# Patient Record
Sex: MALE | Race: BLACK OR AFRICAN AMERICAN | NOT HISPANIC OR LATINO | Employment: UNEMPLOYED | URBAN - METROPOLITAN AREA
[De-identification: names, ages, dates, MRNs, and addresses within clinical notes are randomized per-mention and may not be internally consistent; named-entity substitution may affect disease eponyms.]

---

## 2020-03-22 ENCOUNTER — HOSPITAL ENCOUNTER (EMERGENCY)
Facility: HOSPITAL | Age: 65
Discharge: HOME | End: 2020-03-22
Attending: EMERGENCY MEDICINE
Payer: MEDICARE

## 2020-03-22 VITALS
OXYGEN SATURATION: 98 % | RESPIRATION RATE: 16 BRPM | HEART RATE: 80 BPM | HEIGHT: 69 IN | WEIGHT: 200 LBS | SYSTOLIC BLOOD PRESSURE: 163 MMHG | BODY MASS INDEX: 29.62 KG/M2 | TEMPERATURE: 99 F | DIASTOLIC BLOOD PRESSURE: 100 MMHG

## 2020-03-22 DIAGNOSIS — Z59.00 HOMELESSNESS: ICD-10-CM

## 2020-03-22 DIAGNOSIS — T73.0XXA HUNGRY, INITIAL ENCOUNTER: ICD-10-CM

## 2020-03-22 DIAGNOSIS — I87.2 CHRONIC VENOUS INSUFFICIENCY: Primary | ICD-10-CM

## 2020-03-22 DIAGNOSIS — M79.89 SWELLING OF LOWER EXTREMITY: ICD-10-CM

## 2020-03-22 DIAGNOSIS — I82.812 SUPERFICIAL THROMBOSIS OF LEFT LOWER EXTREMITY: ICD-10-CM

## 2020-03-22 PROCEDURE — 99283 EMERGENCY DEPT VISIT LOW MDM: CPT

## 2020-03-22 PROCEDURE — 99285 EMERGENCY DEPT VISIT HI MDM: CPT | Mod: ,,, | Performed by: EMERGENCY MEDICINE

## 2020-03-22 PROCEDURE — 25000003 PHARM REV CODE 250: Performed by: GENERAL PRACTICE

## 2020-03-22 PROCEDURE — 99285 PR EMERGENCY DEPT VISIT,LEVEL V: ICD-10-PCS | Mod: ,,, | Performed by: EMERGENCY MEDICINE

## 2020-03-22 RX ORDER — FUROSEMIDE 40 MG/1
40 TABLET ORAL
Status: COMPLETED | OUTPATIENT
Start: 2020-03-22 | End: 2020-03-22

## 2020-03-22 RX ADMIN — FUROSEMIDE 40 MG: 40 TABLET ORAL at 04:03

## 2020-03-22 SDOH — SOCIAL DETERMINANTS OF HEALTH (SDOH): HOMELESSNESS UNSPECIFIED: Z59.00

## 2020-03-22 NOTE — ED PROVIDER NOTES
"Encounter Date: 3/22/2020       History     Chief Complaint   Patient presents with    Leg Swelling     BLE edema for years. couldnt able well for 4 days d/t swelling. denies cough, sob     64 year old male with past medical history of superficial DVT, HTN, CHF, HLD, homelessness presents to the emergency department for evaluation of 20+ year's of chronic lower extremity swelling and 2 month's of chronic cough.  Recently evaluated at outside hospital on 03/17/20 with evaluation for heart failure exacerbation, renal dysfunction, respiratory illnesses with laboratory evaluation, CXR, US which were only significant for superficial blood clot.  Given his homelessness social work/case management was consulted to address all his chronic medical conditions and help in getting him all his BP, CHF, and blood clot medications which were sent to the [facility]'s pharmacy.  When asked what his primary complaint was it was " I am damn hungry and can I have some chips or a sandwich"; also "my legs are always damn swollen and hurt:"        Review of patient's allergies indicates:  No Known Allergies  Past Medical History:   Diagnosis Date    Hypertension      History reviewed. No pertinent surgical history.  History reviewed. No pertinent family history.  Social History     Tobacco Use    Smoking status: Current Some Day Smoker   Substance Use Topics    Alcohol use: Yes    Drug use: Never     Review of Systems   Constitutional: Positive for fatigue. Negative for activity change, chills and fever.   HENT: Negative for trouble swallowing and voice change.    Eyes: Negative for photophobia and visual disturbance.   Respiratory: Positive for cough.    Cardiovascular: Positive for leg swelling. Negative for chest pain and palpitations.   Gastrointestinal: Negative for abdominal distention, abdominal pain, nausea and vomiting.   Genitourinary: Negative for dysuria, flank pain and frequency.   Musculoskeletal: Positive for joint " swelling and myalgias.   Skin: Positive for color change and wound.   Neurological: Positive for weakness (generalized). Negative for dizziness, light-headedness and headaches.   Psychiatric/Behavioral: Negative for agitation, behavioral problems and confusion.     Physical Exam     Initial Vitals [03/22/20 0316]   BP Pulse Resp Temp SpO2   130/80 72 20 98.7 °F (37.1 °C) 98 %      MAP       --         Physical Exam    Constitutional: He appears well-developed and well-nourished. He is not diaphoretic. No distress.   Unkept; malodorous male in no apparent distress.    HENT:   Head: Normocephalic and atraumatic.   Right Ear: External ear normal.   Left Ear: External ear normal.   Nose: Nose normal.   Mouth/Throat: Oropharynx is clear and moist.   Neck: No tracheal deviation present. No JVD present.   Cardiovascular: Normal rate, regular rhythm and normal heart sounds.   Monophasic doppler pulses in bilateral lower extremities.    Pulmonary/Chest: No stridor. No respiratory distress. He has no wheezes. He has no rhonchi. He has no rales.   Coughing; speaking in full sentences.    Abdominal: Soft. He exhibits no distension. There is no tenderness. There is no rebound and no guarding.   Musculoskeletal: He exhibits edema (bilateral chronic non-pitting edema).   Neurological: He is alert and oriented to person, place, and time. GCS score is 15. GCS eye subscore is 4. GCS verbal subscore is 5. GCS motor subscore is 6.   Skin: Skin is dry. Rash (superficial venous stasis ulcers) noted.   Psychiatric: He has a normal mood and affect. His behavior is normal. Judgment and thought content normal.       ED Course   Procedures  Labs Reviewed - No data to display       Imaging Results    None          Medical Decision Making:   History:   I obtained history from: another health care provider and EMS provider.  Old Medical Records: I decided to obtain old medical records.  Old Records Summarized: records from another hospital and  "other records.  Initial Assessment:   64 year old male with homelessness, chronic venous insufficiency, CHF, and HTN brought in by EMS this evening with complaints of chronic lower extremity pain and swelling; also when asked why he was primarily here said " I am hungry".  Recent ED evaluation completed; no new findings or subjective complaints warranting complicated work-up.  Afebrile; hemodynamically stable; no hypoxia or evidence of respiratory distress.  Given a sandwich and chips; 40 mg p.o. Lasix; instructed to follow-up with internal medicine on 03/31/20.  Discharged home in stable condition.     Alfie Zazueta DO  PGY-2 Emergency Medicine  4:17 AM                Attending Attestation:   Physician Attestation Statement for Resident:  As the supervising MD   Physician Attestation Statement: I have personally seen and examined this patient.   I agree with the above history. -:   As the supervising MD I agree with the above PE.    As the supervising MD I agree with the above treatment, course, plan, and disposition.   -:     Vitals normal. Afebrile. Here w/ chronic leg pain, swelling and homelessness. Immediately asked for food on arrival. Has chronic appearing venous stasis wounds on legs. Not taking meds as prescribed despite having [facility] social work help to arrange cheap meds. Dose of lasix given here. Advised to take meds as prescribed. Stable for d/c.      I have reviewed the following: old records at this facility.                                  Clinical Impression:       ICD-10-CM ICD-9-CM   1. Chronic venous insufficiency I87.2 459.81   2. Swelling of lower extremity M79.89 729.81   3. Superficial thrombosis of left lower extremity I82.812 453.6   4. Hungry, initial encounter T73.0XXA 994.2   5. Homelessness Z59.0 V60.0             ED Disposition Condition    Discharge Stable        ED Prescriptions     None        Follow-up Information     Follow up With Specialties Details Why Contact Info [facility] Go to   [address] [id number]  [phone]    Internal Medicine [facility]  Go on 3/31/2020 at 0800 in the morning.                                      Alfie Zazueta DO  Resident  03/22/20 4527       Corey Pascal MD  03/22/20 0518

## 2020-03-22 NOTE — ED TRIAGE NOTES
"Geoff Jasso, an 64 y.o. male presents to the ED c/o bilateral leg swelling for multiple years, pt states he now cannot walk. Pt states he has been out of fluid pills for about 9 months. Pt also request turkey sandwich stating "I haven't eaten in 4 days".     Was seen 5 days ago for bilateral leg swelling.  Pt reports no medical problems except HTN.      Chief Complaint   Patient presents with    Leg Swelling     BLE edema for years. couldnt able well for 4 days d/t swelling. denies cough, sob     Review of patient's allergies indicates:  No Known Allergies  Past Medical History:   Diagnosis Date    Hypertension      LOC: The patient is awake, alert and aware of environment with an appropriate affect, the patient is oriented x 3 and speaking appropriately.   APPEARANCE: Patient appears comfortable and in no acute distress, patient is malodorous, poorly groomed.  SKIN: The skin is warm and dry, color consistent with ethnicity, patient has normal skin turgor and moist mucus membranes, BLE venous ulcers noted.   MUSCULOSKELETAL: Patient moving all extremities spontaneously, BLE pitting edema and weakness.   RESPIRATORY: Airway is open and patent, respirations are spontaneous, patient has a normal effort and rate, no accessory muscle use noted. -SOB, cough  CARDIAC: Patient has a normal rate and regular rhythm, no edema noted, capillary refill < 3 seconds. -Chest Pain.  GASTRO: Soft and non tender to palpation, no distention noted. -NVD  : Pt denies any pain or frequency with urination.  NEURO: Pt opens eyes spontaneously, behavior appropriate to situation, follows commands, facial expression symmetrical, bilateral hand grasp equal and even, purposeful motor response noted, normal sensation in all extremities when touched with a finger.  "

## 2020-03-22 NOTE — ED NOTES
Pt resting in wheelchair with eyes closed. In NAD. Ham sandwich on bedside table.  Will continue to monitor.

## 2020-07-15 ENCOUNTER — LAB VISIT (OUTPATIENT)
Dept: LAB | Facility: OTHER | Age: 65
End: 2020-07-15
Payer: MEDICARE

## 2020-07-15 DIAGNOSIS — Z20.822 SUSPECTED COVID-19 VIRUS INFECTION: ICD-10-CM

## 2020-07-15 DIAGNOSIS — Z03.818 ENCOUNTER FOR OBSERVATION FOR SUSPECTED EXPOSURE TO OTHER BIOLOGICAL AGENTS RULED OUT: ICD-10-CM

## 2020-07-15 PROCEDURE — U0003 INFECTIOUS AGENT DETECTION BY NUCLEIC ACID (DNA OR RNA); SEVERE ACUTE RESPIRATORY SYNDROME CORONAVIRUS 2 (SARS-COV-2) (CORONAVIRUS DISEASE [COVID-19]), AMPLIFIED PROBE TECHNIQUE, MAKING USE OF HIGH THROUGHPUT TECHNOLOGIES AS DESCRIBED BY CMS-2020-01-R: HCPCS

## 2020-07-18 LAB — SARS-COV-2 RNA RESP QL NAA+PROBE: NEGATIVE

## 2025-05-12 ENCOUNTER — OFFICE VISIT (OUTPATIENT)
Dept: NEUROLOGY | Facility: CLINIC | Age: 70
End: 2025-05-12
Payer: MEDICARE

## 2025-05-12 VITALS
WEIGHT: 182.63 LBS | DIASTOLIC BLOOD PRESSURE: 75 MMHG | SYSTOLIC BLOOD PRESSURE: 132 MMHG | HEART RATE: 87 BPM | BODY MASS INDEX: 27.05 KG/M2 | HEIGHT: 69 IN

## 2025-05-12 DIAGNOSIS — G40.909 SEIZURE DISORDER: Primary | ICD-10-CM

## 2025-05-12 PROCEDURE — 99214 OFFICE O/P EST MOD 30 MIN: CPT | Mod: S$PBB,,, | Performed by: NEUROLOGICAL SURGERY

## 2025-05-12 PROCEDURE — 99999 PR PBB SHADOW E&M-NEW PATIENT-LVL III: CPT | Mod: PBBFAC,,, | Performed by: NEUROLOGICAL SURGERY

## 2025-05-12 PROCEDURE — 99203 OFFICE O/P NEW LOW 30 MIN: CPT | Mod: PBBFAC | Performed by: NEUROLOGICAL SURGERY

## 2025-05-12 RX ORDER — QUETIAPINE FUMARATE 100 MG/1
100 TABLET, FILM COATED ORAL NIGHTLY
COMMUNITY

## 2025-05-12 RX ORDER — MULTIPLE VITAMINS W/ MINERALS TAB 9MG-400MCG
TAB ORAL
COMMUNITY

## 2025-05-12 RX ORDER — DIVALPROEX SODIUM 250 MG/1
750 TABLET, DELAYED RELEASE ORAL
COMMUNITY
Start: 2024-07-08 | End: 2025-07-08

## 2025-05-12 RX ORDER — ATORVASTATIN CALCIUM 80 MG/1
80 TABLET, FILM COATED ORAL
COMMUNITY
Start: 2024-07-09 | End: 2025-07-09

## 2025-05-12 RX ORDER — LANOLIN ALCOHOL/MO/W.PET/CERES
100 CREAM (GRAM) TOPICAL
COMMUNITY

## 2025-05-12 RX ORDER — OMEGA-3 FATTY ACIDS 1000 MG
2 CAPSULE ORAL
COMMUNITY

## 2025-05-12 RX ORDER — DONEPEZIL HYDROCHLORIDE 5 MG/1
5 TABLET, FILM COATED ORAL NIGHTLY
COMMUNITY
Start: 2024-05-14 | End: 2025-05-14

## 2025-05-12 RX ORDER — LANOLIN ALCOHOL/MO/W.PET/CERES
1 CREAM (GRAM) TOPICAL
COMMUNITY

## 2025-05-12 RX ORDER — NAPROXEN SODIUM 220 MG/1
81 TABLET, FILM COATED ORAL
COMMUNITY
Start: 2024-07-09 | End: 2025-07-09

## 2025-05-12 RX ORDER — GABAPENTIN 100 MG/1
100 CAPSULE ORAL 2 TIMES DAILY
COMMUNITY

## 2025-05-12 RX ORDER — TAMSULOSIN HYDROCHLORIDE 0.4 MG/1
1 CAPSULE ORAL NIGHTLY
COMMUNITY

## 2025-05-12 RX ORDER — ZONISAMIDE 100 MG/1
400 CAPSULE ORAL
COMMUNITY
Start: 2024-11-28

## 2025-05-12 NOTE — PROGRESS NOTES
Name: Geoff Jasso  MRN: [id number]   [facility]: [id number]      Date: 05/12/2025      History of Present Illness    CHIEF COMPLAINT:  Mr. Jasso presents today for follow up for seizure management    SEIZURE MANAGEMENT:  He denies any seizures since last visit while continuing current medication regimen. His medications include Depakote 500 mg twice daily, Gabapentin, zonisamide Quetiapine, and Aspirin.      ROS:  General: -fever, -chills, -fatigue, -weight gain, -weight loss  Eyes: -vision changes, -redness, -discharge  ENT: -ear pain, -nasal congestion, -sore throat  Cardiovascular: -chest pain, -palpitations, -lower extremity edema  Respiratory: -cough, -shortness of breath  Gastrointestinal: -abdominal pain, -nausea, -vomiting, -diarrhea, -constipation, -blood in stool  Genitourinary: -dysuria, -hematuria, -frequency  Musculoskeletal: -joint pain, -muscle pain  Skin: -rash, -lesion  Neurological: -headache, -dizziness, -numbness, -tingling  Psychiatric: -anxiety, -depression, -sleep difficulty              Past Medical History: The patient  has a past medical history of Hypertension.    Social History: The patient  reports that he has been smoking. He does not have any smokeless tobacco history on file. He reports current alcohol use. He reports that he does not use drugs.    Family History: Their family history is not on file.    Allergies: Patient has no known allergies.     Meds: Scheduled Meds:  Continuous Infusions:  PRN Meds:.    Exam:  Physical Exam    General: No acute distress. Well-developed. Well-nourished.  Eyes: EOMI. Sclerae anicteric.  HENT: Normocephalic. Atraumatic. Nares patent. Moist oral mucosa.  Ears: Bilateral TMs clear. Bilateral EACs clear.  Cardiovascular: Regular rate. Regular rhythm. No murmurs. No rubs. No gallops. Normal S1, S2.  Respiratory: Normal respiratory effort. Clear to auscultation bilaterally. No rales. No rhonchi. No wheezing.  Abdomen: Soft. Non-tender. Non-distended.  "Normoactive bowel sounds.  Musculoskeletal: No  obvious deformity.  Extremities: No lower extremity edema.  Neurological: Alert & oriented x3. No slurred speech. Normal gait.  Psychiatric: Normal mood. Normal affect. Good insight. Good judgment.  Skin: Warm. Dry. No rash.          /75   Pulse 87   Ht 5' 9" (1.753 m)   Wt 82.9 kg (182 lb 10.4 oz)   BMI 26.97 kg/m²     Constitutional  Well-developed, well-nourished, appears stated age   Ophthalmoscopic  No papilledema with no hemorrhages or exudates bilaterally   Cardiovascular  Radial pulses 2+ and symmetric, no LE edema bilaterally   Neurological    * Mental status MOCA 20     - Orientation  Oriented to person, place, time, and situation     - Memory   Intact recent and remote     - Attention/concentration  Attentive, vigilant during exam     - Language  Naming & repetition intact, +2-step commands     - Fund of knowledge  Aware of current events     - Executive  Well-organized thoughts     - Other     * Cranial nerves       - CN II  PERRL, visual fields full to confrontation     - CN III, IV, VI  Extraocular movements full, normal pursuits and saccades     - CN V  Sensation V1 - V3 intact     - CN VII  Face strong and symmetric bilaterally     - CN VIII  Hearing intact bilaterally     - CN IX, X  Palate raises midline and symmetric     - CN XI  SCM and trapezius 5/5 bilaterally     - CN XII  Tongue midline   * Motor  Muscle bulk normal, strength 5/5 throughout   * Sensory   Intact to temperature and vibration throughout   * Coordination  No dysmetria with finger-to-nose or heel-to-shin   * Gait  Wheelchair-bound   * Deep tendon reflexes  2+ and symmetric throughout   Babinski downgoing bilaterally     Laboratory/Radiological:Reviewed  - Results:  No visits with results within 1 Month(s) from this visit.   Latest known visit with results is:   Lab Visit on 07/15/2020   Component Date Value Ref Range Status    SARS-CoV-2 RNA, Amplification, Qual 07/15/2020 " Negative   Final       - Independent review of images:        Assessment & Plan    IMPRESSION:  - Reviewed current medication regimen, including Aspirin, Depakote (500 mg twice daily), Gabapentin, and Quetiapine.  - No recent seizures reported.    GENERAL MEDICAL MANAGEMENT:  - Ordered CBC, CMP, and Depakote level to monitor therapeutic efficacy and potential side effects.    CARDIOVASCULAR MANAGEMENT:  - Continued Aspirin.    NEUROLOGICAL MANAGEMENT:  - Continued Depakote 500 mg twice daily.  - Continued Gabapentin.    PSYCHIATRIC MANAGEMENT:  - Continued Quetiapine.    FOLLOW-UP:  - Follow up in 6 months.    PLAN SUMMARY:  - Continue Quetiapine  - Continue Aspirin  - Continue Gabapentin 400 mg b.i.d.  - Continue Depakote 500 mg twice daily  Continue zonisamide 400 mg daily  - Ordered CBC, CMP, and Depakote level  - Follow up in 6 months          Risks/benefits, potential side effects of medications, and alternative therapies discussed as appropriate.    This note was generated with the assistance of ambient listening technology. Verbal consent was obtained by the patient and accompanying visitor(s) for the recording of patient appointment to facilitate this note. I attest to having reviewed and edited the generated note for accuracy, though some syntax or spelling errors may persist. Please contact the author of this note for any clarification.       JOHN London M.D.

## 2025-05-14 ENCOUNTER — TELEPHONE (OUTPATIENT)
Dept: NEUROLOGY | Facility: CLINIC | Age: 70
End: 2025-05-14
Payer: MEDICARE

## 2025-05-14 NOTE — TELEPHONE ENCOUNTER
----- Message from Dianne sent at 5/14/2025  1:47 PM CDT -----  Name of Caller:Tran turner/ [facility] kinjal Nature of Call: requesting a call to clarify ordersBest Call Back Number: [phone] Additional Information:Tran turner/ [facility] calling regarding Patient Advice for needing to get clarification on the orders that they received on the PT. Please call back to assist

## 2025-05-16 ENCOUNTER — TELEPHONE (OUTPATIENT)
Facility: CLINIC | Age: 70
End: 2025-05-16
Payer: MEDICARE

## 2025-05-16 NOTE — TELEPHONE ENCOUNTER
----- Message from Nandini Islas sent at 5/16/2025 11:55 AM CDT -----  Regarding: Advise  Contact: [phone]  Type:  AdviceWho Called: Celine Jarvisould the patient rather a call back or a response via [facility]? Call [facility] Call Back Number: [phone] Information:  is calling asking for a return call to get clarity on lab orders that they received for pt. Please give [facility] a call to provider clarity. Please give clarification to anyone who answers the phone.